# Patient Record
Sex: FEMALE | Race: WHITE | NOT HISPANIC OR LATINO | Employment: UNEMPLOYED | URBAN - METROPOLITAN AREA
[De-identification: names, ages, dates, MRNs, and addresses within clinical notes are randomized per-mention and may not be internally consistent; named-entity substitution may affect disease eponyms.]

---

## 2023-03-14 ENCOUNTER — TRANSCRIBE ORDERS (OUTPATIENT)
Dept: ADMINISTRATIVE | Facility: HOSPITAL | Age: 38
End: 2023-03-14
Payer: OTHER GOVERNMENT

## 2023-03-14 DIAGNOSIS — M79.645 FINGER PAIN, LEFT: Primary | ICD-10-CM

## 2023-03-20 ENCOUNTER — HOSPITAL ENCOUNTER (OUTPATIENT)
Dept: MRI IMAGING | Facility: HOSPITAL | Age: 38
Discharge: HOME | End: 2023-03-20
Admitting: STUDENT IN AN ORGANIZED HEALTH CARE EDUCATION/TRAINING PROGRAM
Payer: OTHER GOVERNMENT

## 2023-03-20 DIAGNOSIS — M79.645 FINGER PAIN, LEFT: ICD-10-CM

## 2023-03-20 PROCEDURE — 73218 MRI UPPER EXTREMITY W/O DYE: CPT

## 2023-04-06 ENCOUNTER — OFFICE VISIT (OUTPATIENT)
Dept: NEUROLOGY | Facility: CLINIC | Age: 38
End: 2023-04-06
Payer: OTHER GOVERNMENT

## 2023-04-06 ENCOUNTER — LAB (OUTPATIENT)
Dept: LAB | Facility: HOSPITAL | Age: 38
End: 2023-04-06
Payer: OTHER GOVERNMENT

## 2023-04-06 ENCOUNTER — PATIENT ROUNDING (BHMG ONLY) (OUTPATIENT)
Dept: NEUROLOGY | Facility: CLINIC | Age: 38
End: 2023-04-06
Payer: OTHER GOVERNMENT

## 2023-04-06 VITALS
HEIGHT: 66 IN | HEART RATE: 70 BPM | SYSTOLIC BLOOD PRESSURE: 131 MMHG | WEIGHT: 214 LBS | BODY MASS INDEX: 34.39 KG/M2 | DIASTOLIC BLOOD PRESSURE: 92 MMHG

## 2023-04-06 DIAGNOSIS — G70.00 OCULAR MYASTHENIA GRAVIS: Primary | ICD-10-CM

## 2023-04-06 DIAGNOSIS — G70.00 OCULAR MYASTHENIA GRAVIS: ICD-10-CM

## 2023-04-06 LAB
CK SERPL-CCNC: 129 U/L (ref 20–180)
TSH SERPL DL<=0.05 MIU/L-ACNC: 2.59 UIU/ML (ref 0.27–4.2)

## 2023-04-06 PROCEDURE — 82550 ASSAY OF CK (CPK): CPT

## 2023-04-06 PROCEDURE — 83519 RIA NONANTIBODY: CPT

## 2023-04-06 PROCEDURE — 36415 COLL VENOUS BLD VENIPUNCTURE: CPT

## 2023-04-06 PROCEDURE — 99203 OFFICE O/P NEW LOW 30 MIN: CPT | Performed by: PSYCHIATRY & NEUROLOGY

## 2023-04-06 PROCEDURE — 84443 ASSAY THYROID STIM HORMONE: CPT

## 2023-04-06 RX ORDER — LORATADINE 10 MG/1
10 TABLET ORAL DAILY
COMMUNITY
Start: 2023-03-14

## 2023-04-06 RX ORDER — MONTELUKAST SODIUM 10 MG/1
10 TABLET ORAL DAILY
COMMUNITY
Start: 2018-01-04

## 2023-04-06 RX ORDER — FLUTICASONE PROPIONATE 50 MCG
1-2 SPRAY, SUSPENSION (ML) NASAL ONCE AS NEEDED
COMMUNITY
Start: 2023-03-14

## 2023-04-06 NOTE — ASSESSMENT & PLAN NOTE
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  If there is no abnormality in the acetylcholine receptor antibody titers further work-up will be initiated including CT scan of the thymus, neuroimaging studies of the brain.  If work-up is negative on her next like visit we will give her a trial of Mestinon to see if it improves her ptosis.

## 2023-04-06 NOTE — PROGRESS NOTES
"Chief Complaint  Neurologic Problem (Eye droop- R/O neurological problem. )    Subjective          Tahira Mahajan is a 38 y.o. female who presents to [facility]  History of Present Illness  38-year-old woman evaluated for symptoms of right eye ptosis since October.  She states that her right eyelid is droopy.  She states that she has had Botox injections last year because her forehead is wrinkled.  She had Botox injections to her forehead.  She thought it was the Botox injections that started causing her right eye to droop.  She is not seeing double.  It comes and goes.  There is times that she does not think her right eyes droop and then there is times she thinks that her right eye is droop significantly that she took pictures of herself.  There is a couple of times in which her right eyelid obscured her visual site because he was drooping at the level of her pupil.  She has no swallowing problems.  She has no weakness of her upper extremities.  She has a history of meningitis when she was younger and she wanted to know if this was part of it.    Objective   Vital Signs:   /92   Pulse 70   Ht 167.6 cm (66\")   Wt 97.1 kg (214 lb)   BMI 34.54 kg/m²     Physical Exam   She is alert, fluent, phasic, follows commands well.  Palpebral fissure is 10 mm on the left and 8 mm on the right.  Pupils are 3 mm briskly reactive to light.  Forcefully looking up for a minute made her see double but there was no ptosis.  There is 4/5 strength of bilateral frontalis muscles.  There is no weakness of the orbicularis oculi or oris muscle.  There is no weakness of facial muscles.  Soft palate elevation and tongue are normal.  There is no weakness of the upper or lower extremities.  Reflexes are normoactive and symmetrical.  Station gait she is able to tiptoe, heel walk, william without any difficulty.        Assessment and Plan  Diagnoses and all orders for this visit:    1. 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  If there is no abnormality in the acetylcholine receptor antibody titers further work-up will be initiated including CT scan of the thymus, neuroimaging studies of the brain.  If work-up is negative on her next like visit we will give her a trial of Mestinon to see if it improves her ptosis.    Orders:  -     Acetylcholine Receptor Antibody Panel; Future  -     TSH; Future  -     CK; Future  -     Musk Antibody; Future       Total time spent with the patient and coordinating patient care was 35 minutes.    Follow Up  No follow-ups on file.  Patient was given instructions and counseling regarding her condition or for health maintenance advice. Please see specific information pulled into the AVS if appropriate.

## 2023-04-14 LAB
ACHR BIND AB SER-SCNC: <0.03 NMOL/L (ref 0–0.24)
ACHR BLOCK AB SER-ACNC: 12 % (ref 0–25)
ACHR MOD AB SER QL FC: 0 % (ref 0–45)
MUSK AB SER IA-ACNC: <1 U/ML

## 2023-04-20 ENCOUNTER — OFFICE VISIT (OUTPATIENT)
Dept: NEUROLOGY | Facility: CLINIC | Age: 38
End: 2023-04-20
Payer: OTHER GOVERNMENT

## 2023-04-20 VITALS
WEIGHT: 215 LBS | HEIGHT: 66 IN | DIASTOLIC BLOOD PRESSURE: 91 MMHG | HEART RATE: 67 BPM | SYSTOLIC BLOOD PRESSURE: 126 MMHG | BODY MASS INDEX: 34.55 KG/M2

## 2023-04-20 DIAGNOSIS — G70.00 OCULAR MYASTHENIA GRAVIS: Primary | ICD-10-CM

## 2023-04-20 NOTE — ASSESSMENT & PLAN NOTE
I discussed with her that we can try her on Mestinon to see if the ptosis will improve.  She states that she wants diagnostic testing to be performed first before trying any medications.  I will order an MRI of the chest/mediastinum as well as MRI of the brain with and without contrast.  I will also refer her to [facility] neuromuscular clinic to see Dr. Patel for second opinion.  She is to call us to find out the results of the testing.  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

## 2023-05-04 ENCOUNTER — HOSPITAL ENCOUNTER (OUTPATIENT)
Dept: MRI IMAGING | Facility: HOSPITAL | Age: 38
Discharge: HOME | End: 2023-05-04
Payer: OTHER GOVERNMENT

## 2023-05-04 DIAGNOSIS — G70.00 OCULAR MYASTHENIA GRAVIS: ICD-10-CM

## 2023-05-04 PROCEDURE — 70553 MRI BRAIN STEM W/O & W/DYE: CPT

## 2023-05-04 PROCEDURE — 71551 MRI CHEST W/DYE: CPT

## 2023-05-04 PROCEDURE — 0 GADOBENATE DIMEGLUMINE 529 MG/ML SOLUTION: Performed by: PSYCHIATRY & NEUROLOGY

## 2023-05-04 PROCEDURE — A9577 INJ MULTIHANCE: HCPCS | Performed by: PSYCHIATRY & NEUROLOGY

## 2023-05-04 RX ADMIN — GADOBENATE DIMEGLUMINE 20 ML: 529 INJECTION, SOLUTION INTRAVENOUS at 10:08

## 2023-05-22 ENCOUNTER — TRANSCRIBE ORDERS (OUTPATIENT)
Dept: ADMINISTRATIVE | Facility: HOSPITAL | Age: 38
End: 2023-05-22
Payer: OTHER GOVERNMENT

## 2023-05-22 DIAGNOSIS — E04.1 THYROID NODULE: Primary | ICD-10-CM

## 2023-06-07 ENCOUNTER — HOSPITAL ENCOUNTER (OUTPATIENT)
Dept: ULTRASOUND IMAGING | Facility: HOSPITAL | Age: 38
Discharge: HOME | End: 2023-06-07
Admitting: NURSE PRACTITIONER
Payer: OTHER GOVERNMENT

## 2023-06-07 DIAGNOSIS — E04.1 THYROID NODULE: ICD-10-CM

## 2023-06-07 PROCEDURE — 76536 US EXAM OF HEAD AND NECK: CPT

## 2023-08-14 ENCOUNTER — PREP FOR SURGERY (OUTPATIENT)
Dept: OTHER | Facility: HOSPITAL | Age: 38
End: 2023-08-14
Payer: OTHER GOVERNMENT

## 2023-08-14 ENCOUNTER — OFFICE VISIT (OUTPATIENT)
Dept: SURGERY | Facility: CLINIC | Age: 38
End: 2023-08-14
Payer: OTHER GOVERNMENT

## 2023-08-14 VITALS — HEIGHT: 66 IN | BODY MASS INDEX: 35.2 KG/M2 | HEART RATE: 99 BPM | WEIGHT: 219 LBS

## 2023-08-14 DIAGNOSIS — Z12.11 SCREENING FOR MALIGNANT NEOPLASM OF COLON: Primary | ICD-10-CM

## 2023-08-14 RX ORDER — SODIUM CHLORIDE 9 MG/ML
40 INJECTION, SOLUTION INTRAVENOUS AS NEEDED
OUTPATIENT
Start: 2023-08-14

## 2023-08-14 RX ORDER — SODIUM CHLORIDE 0.9 % (FLUSH) 0.9 %
3 SYRINGE (ML) INJECTION EVERY 12 HOURS SCHEDULED
OUTPATIENT
Start: 2023-08-14

## 2023-08-14 RX ORDER — SODIUM CHLORIDE 0.9 % (FLUSH) 0.9 %
10 SYRINGE (ML) INJECTION AS NEEDED
OUTPATIENT
Start: 2023-08-14

## 2023-08-14 RX ORDER — PEG-3350, SODIUM SULFATE, SODIUM CHLORIDE, POTASSIUM CHLORIDE, SODIUM ASCORBATE AND ASCORBIC ACID 7.5-2.691G
1000 KIT ORAL ONCE
Qty: 1000 ML | Refills: 0 | Status: SHIPPED | OUTPATIENT
Start: 2023-08-14 | End: 2023-08-14

## 2023-08-14 NOTE — PROGRESS NOTES
Chief Complaint: Colonoscopy (Family History of Colon Cancer/)    Subjective      Colonoscopy consultation       History of Present Illness  Tahira Mahajan is a 38 y.o. female presents to [facility] for colonoscopy consultation.    Patient presents today on referral from Ila Walker for colonoscopy consultation.  Patient denies any abdominal pain, change in bowel habit, or rectal bleeding.  Denies any family history of colorectal cancer.  Patient does report that her family has a history of polyps with her father, and her sister.  No previous colonoscopy.    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    PCOS (polycystic ovarian syndrome)     PONV (postoperative nausea and vomiting)     Slow to wake with so much nausea    Shingles 2010    On my forehead    Thyroid nodule A cyst noted 06/2023    Ultrasound benign       Past Surgical History:   Procedure Laterality Date    BREAST BIOPSY      Nipple biopsy 2016 - no mass or cancer    TONSILLECTOMY      WISDOM TOOTH EXTRACTION         Outpatient Medications Marked as Taking for the 8/14/23 encounter (Office Visit) with Rani Rousseau APRN   Medication Sig Dispense Refill    fluticasone (FLONASE) 50 MCG/ACT nasal spray 1-2 sprays 1 (One) Time As Needed.      loratadine (CLARITIN) 10 MG tablet 1 tablet Daily.      metFORMIN (GLUCOPHAGE) 500 MG tablet Take 1 tablet by mouth Daily With Breakfast.      montelukast (SINGULAIR) 10 MG tablet Take 1 tablet by mouth Daily.         Allergies   Allergen Reactions    Sulfa Antibiotics Rash        Family History   Problem Relation Age of Onset    Arthritis Mother     Cancer Mother         Skin    Cancer Father         Prostate    Cancer Sister          "Precancerous pap - hysterectomy       Social History     Socioeconomic History    Marital status:    Tobacco Use    Smoking status: Never    Smokeless tobacco: Never   Vaping Use    Vaping Use: Never used   Substance and Sexual Activity    Alcohol use: Yes     Alcohol/week: 3.0 standard drinks     Types: 3 Cans of beer per week     Comment: socially    Drug use: Never    Sexual activity: Yes     Partners: Male     Birth control/protection: Surgical, Vasectomy       Review of Systems   Constitutional:  Negative for chills and fever.   Gastrointestinal:  Negative for abdominal distention, abdominal pain, anal bleeding, blood in stool, constipation, diarrhea and rectal pain.      Vital Signs:   Pulse 99   Ht 167.6 cm (66\")   Wt 99.3 kg (219 lb)   BMI 35.35 kg/mý      Physical Exam  Vitals and nursing note reviewed.   Constitutional:       General: She is not in acute distress.     Appearance: Normal appearance.   HENT:      Head: Normocephalic.   Cardiovascular:      Rate and Rhythm: Normal rate.   Pulmonary:      Effort: Pulmonary effort is normal.      Breath sounds: No stridor.   Abdominal:      Palpations: Abdomen is soft.      Tenderness: There is no guarding.   Musculoskeletal:         General: No deformity. Normal range of motion.   Skin:     General: Skin is warm and dry.      Coloration: Skin is not jaundiced.   Neurological:      General: No focal deficit present.      Mental Status: She is alert and oriented to person, place, and time.   Psychiatric:         Mood and Affect: Mood normal.         Thought Content: Thought content normal.        Result Review :          []  Laboratory  []  Radiology  []  Pathology  []  Microbiology  []  EKG/Telemetry   []  Cardiology/Vascular   []  Old records  I spent 15 minutes caring for Sukhjinder this date of service. This time includes time spent by me in the following activities: reviewing tests, obtaining and/or reviewing a separately obtained history, " performing a medically appropriate examination and/or evaluation, ordering medications, tests, or procedures, and documenting information in the medical record.     Assessment and Plan    Diagnoses and all orders for this visit:    1. Screening for malignant neoplasm of colon (Primary)    Other orders  -     PEG-KCl-NaCl-NaSulf-Na Asc-C (MOVIPREP) 100 g reconstituted solution powder; Take 1,000 mL by mouth 1 (One) Time for 1 dose.  Dispense: 1000 mL; Refill: 0        Follow Up   Return for Schedule colonoscopy with Dr. Coles on 1/10/2023 at [facility].    Hospital arrival time: 0700.    Possible risks/complications, benefits, and alternatives to surgical or invasive procedures have been explained to patient and/or legal guardian.    Patient has been evaluated and can tolerate anesthesia and/or sedation. Risks, benefits, and alternatives to anesthesia and sedation have been explained to the patient and/or legal guardian. Patient verbalizes understanding and is willing to proceed with the above plan.     Patient was given instructions and counseling regarding her condition or for health maintenance advice. Please see specific information pulled into the AVS if appropriate.

## 2024-01-04 NOTE — PRE-PROCEDURE INSTRUCTIONS
PAT call attempted.  No answer.  Left detailed message with:  date, arrival time of 0700,   location, need for , children under 12 must remain in waiting room, diet/NPO, meds, bring list of meds to hospital, and call back number for questions.

## 2024-01-09 ENCOUNTER — ANESTHESIA EVENT (OUTPATIENT)
Dept: GASTROENTEROLOGY | Facility: HOSPITAL | Age: 39
End: 2024-01-09
Payer: OTHER GOVERNMENT

## 2024-01-09 NOTE — ANESTHESIA PREPROCEDURE EVALUATION
Anesthesia Evaluation     history of anesthetic complications:  PONV prolonged sedation  NPO Solid Status: > 8 hours  NPO Liquid Status: > 2 hours           Airway   Mallampati: I  TM distance: >3 FB  No difficulty expected  Dental - normal exam     Pulmonary - normal exam   Cardiovascular - normal exam    (+) hyperlipidemia      Neuro/Psych  (+) headaches  GI/Hepatic/Renal/Endo    (+) thyroid problem thyroid nodules    Musculoskeletal     Abdominal    Substance History      OB/GYN          Other                          Anesthesia Plan    ASA 2     general   total IV anesthesia    Anesthetic plan, risks, benefits, and alternatives have been provided, discussed and informed consent has been obtained with: patient.  Pre-procedure education provided  Plan discussed with CRNA.        CODE STATUS:

## 2024-01-10 ENCOUNTER — HOSPITAL ENCOUNTER (OUTPATIENT)
Facility: HOSPITAL | Age: 39
Setting detail: HOSPITAL OUTPATIENT SURGERY
Discharge: HOME | End: 2024-01-10
Attending: SURGERY | Admitting: SURGERY
Payer: OTHER GOVERNMENT

## 2024-01-10 ENCOUNTER — ANESTHESIA (OUTPATIENT)
Dept: GASTROENTEROLOGY | Facility: HOSPITAL | Age: 39
End: 2024-01-10
Payer: OTHER GOVERNMENT

## 2024-01-10 VITALS
HEIGHT: 66 IN | SYSTOLIC BLOOD PRESSURE: 118 MMHG | HEART RATE: 72 BPM | WEIGHT: 216.05 LBS | TEMPERATURE: 98.1 F | OXYGEN SATURATION: 100 % | RESPIRATION RATE: 15 BRPM | BODY MASS INDEX: 34.72 KG/M2 | DIASTOLIC BLOOD PRESSURE: 82 MMHG

## 2024-01-10 DIAGNOSIS — Z12.11 SCREENING FOR MALIGNANT NEOPLASM OF COLON: ICD-10-CM

## 2024-01-10 LAB
B-HCG UR QL: NEGATIVE
GLUCOSE BLDC GLUCOMTR-MCNC: 85 MG/DL (ref 70–99)

## 2024-01-10 PROCEDURE — 25010000002 PROPOFOL 10 MG/ML EMULSION: Performed by: NURSE ANESTHETIST, CERTIFIED REGISTERED

## 2024-01-10 PROCEDURE — 25810000003 LACTATED RINGERS PER 1000 ML

## 2024-01-10 PROCEDURE — 82948 REAGENT STRIP/BLOOD GLUCOSE: CPT

## 2024-01-10 PROCEDURE — 81025 URINE PREGNANCY TEST: CPT | Performed by: SURGERY

## 2024-01-10 RX ORDER — SODIUM CHLORIDE 0.9 % (FLUSH) 0.9 %
3 SYRINGE (ML) INJECTION EVERY 12 HOURS SCHEDULED
Status: DISCONTINUED | OUTPATIENT
Start: 2024-01-10 | End: 2024-01-10 | Stop reason: HOSPADM

## 2024-01-10 RX ORDER — SODIUM CHLORIDE 0.9 % (FLUSH) 0.9 %
10 SYRINGE (ML) INJECTION AS NEEDED
Status: DISCONTINUED | OUTPATIENT
Start: 2024-01-10 | End: 2024-01-10 | Stop reason: HOSPADM

## 2024-01-10 RX ORDER — SODIUM CHLORIDE, SODIUM LACTATE, POTASSIUM CHLORIDE, CALCIUM CHLORIDE 600; 310; 30; 20 MG/100ML; MG/100ML; MG/100ML; MG/100ML
30 INJECTION, SOLUTION INTRAVENOUS CONTINUOUS
Status: DISCONTINUED | OUTPATIENT
Start: 2024-01-10 | End: 2024-01-10 | Stop reason: HOSPADM

## 2024-01-10 RX ORDER — SODIUM CHLORIDE 9 MG/ML
40 INJECTION, SOLUTION INTRAVENOUS AS NEEDED
Status: DISCONTINUED | OUTPATIENT
Start: 2024-01-10 | End: 2024-01-10 | Stop reason: HOSPADM

## 2024-01-10 RX ORDER — PROPOFOL 10 MG/ML
VIAL (ML) INTRAVENOUS AS NEEDED
Status: DISCONTINUED | OUTPATIENT
Start: 2024-01-10 | End: 2024-01-10 | Stop reason: SURG

## 2024-01-10 RX ORDER — LIDOCAINE HYDROCHLORIDE 20 MG/ML
INJECTION, SOLUTION EPIDURAL; INFILTRATION; INTRACAUDAL; PERINEURAL AS NEEDED
Status: DISCONTINUED | OUTPATIENT
Start: 2024-01-10 | End: 2024-01-10 | Stop reason: SURG

## 2024-01-10 RX ADMIN — PROPOFOL 250 MCG/KG/MIN: 10 INJECTION, EMULSION INTRAVENOUS at 08:44

## 2024-01-10 RX ADMIN — PROPOFOL 100 MG: 10 INJECTION, EMULSION INTRAVENOUS at 08:44

## 2024-01-10 RX ADMIN — LIDOCAINE HYDROCHLORIDE 100 MG: 20 INJECTION, SOLUTION EPIDURAL; INFILTRATION; INTRACAUDAL; PERINEURAL at 08:44

## 2024-01-10 RX ADMIN — SODIUM CHLORIDE, POTASSIUM CHLORIDE, SODIUM LACTATE AND CALCIUM CHLORIDE 30 ML/HR: 600; 310; 30; 20 INJECTION, SOLUTION INTRAVENOUS at 07:59

## 2024-01-10 NOTE — H&P
Chief Complaint: No chief complaint on file.    Subjective      Colonoscopy consultation       History of Present Illness  Tahira Mahajan is a 38 y.o. female presents to [facility] for colonoscopy consultation.    Patient presents today on referral from Ila Walker for colonoscopy consultation.  Patient denies any abdominal pain, change in bowel habit, or rectal bleeding.  Denies any family history of colorectal cancer.  Patient does report that her family has a history of polyps with her father, and her sister.  No previous colonoscopy.    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       Allergies   Allergen Reactions    Sulfa Antibiotics Rash        Family History   Problem Relation Age of Onset    Arthritis Mother     Cancer Mother         Skin    Cancer Father         Prostate    Cancer Sister         Precancerous pap - hysterectomy       Social History     Socioeconomic History    Marital status:    Tobacco Use    Smoking status: Never    Smokeless tobacco: Never   Vaping Use    Vaping Use: Never used   Substance and Sexual Activity    Alcohol use: Yes     Alcohol/week: 3.0 standard drinks of alcohol     Types: 3 Cans of beer per week     Comment: socially     Drug use: Never    Sexual activity: Yes     Partners: Male     Birth control/protection: Surgical, Vasectomy       Physical Exam  Vitals - Available in the EMR.  Reviewed.  Respiratory:  breathing not labored, respiratory effort appears normal  Cardiovascular:  heart regular rate  Skin and subcutaneous tissue:  warm and dry  Musculoskeletal: moving all extremities symmetrically and purposefully  Neurologic:  no obvious motor or sensory deficits, speech clear  Psychiatric:  judgment and insight intact, mood normal      Assessment   Family history of colon polyps    Plan  Colonoscopy    Risks and benefits discussed    Brennon Coles M.D.  01/10/24    Electronically signed by Brennon Coles MD, 01/10/24, 7:21 AM EST.

## 2024-01-10 NOTE — ANESTHESIA POSTPROCEDURE EVALUATION
Patient: Tahira Mahajan    Procedure Summary       Date: 01/10/24 Room / Location: [facility] END[facility]COPY 1 / [facility]    Anesthesia Start: 0842 Anesthesia Stop: 0908    Procedure: COLONOSCOPY Diagnosis:       Screening for malignant neoplasm of colon      (Screening for malignant neoplasm of colon [Z12.11])    Surgeons: Brennon Coles MD Provider: Odalis Matamoros CRNA    Anesthesia Type: general ASA Status: 2            Anesthesia Type: general    Vitals  Vitals Value Taken Time   /82 01/10/24 0923   Temp 36.7 °C (98.1 °F) 01/10/24 0922   Pulse 73 01/10/24 0923   Resp 15 01/10/24 0922   SpO2 100 % 01/10/24 0923   Vitals shown include unfiled device data.        Post Anesthesia Care and Evaluation    Post-procedure mental status: acceptable.  Pain management: satisfactory to patient    Airway patency: patent  Anesthetic complications: No anesthetic complications    Cardiovascular status: acceptable  Respiratory status: acceptable    Comments: Per chart review

## (undated) DEVICE — SOLIDIFIER LIQLOC PLS 1500CC BT

## (undated) DEVICE — SOL IRRG H2O PL/BG 1000ML STRL

## (undated) DEVICE — CONN JET HYDRA H20 AUXILIARY DISP

## (undated) DEVICE — SOL IRR NACL 0.9PCT BT 1000ML

## (undated) DEVICE — Device

## (undated) DEVICE — LINER SURG CANSTR SXN S/RIGD 1500CC

## (undated) DEVICE — GLV SURG BIOGEL LTX PF 7 1/2

## (undated) DEVICE — Device: Brand: DEFENDO AIR/WATER/SUCTION AND BIOPSY VALVE